# Patient Record
Sex: MALE | Race: WHITE | NOT HISPANIC OR LATINO | URBAN - METROPOLITAN AREA
[De-identification: names, ages, dates, MRNs, and addresses within clinical notes are randomized per-mention and may not be internally consistent; named-entity substitution may affect disease eponyms.]

---
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
## 2020-11-09 ENCOUNTER — OFFICE VISIT (OUTPATIENT)
Dept: URBAN - METROPOLITAN AREA CLINIC 71 | Facility: CLINIC | Age: 20
End: 2020-11-09
Payer: COMMERCIAL

## 2020-11-09 PROCEDURE — 92014 COMPRE OPH EXAM EST PT 1/>: CPT | Performed by: OPTOMETRIST

## 2020-11-09 ASSESSMENT — INTRAOCULAR PRESSURE
OS: 16
OD: 14

## 2020-11-09 NOTE — IMPRESSION/PLAN
Impression: Myopia, bilateral: H52.13 Bilateral. Plan: Discussed the diagnosis with pt in detail, new glasses rx given today. will r/c in 1 yr annual vision.

## 2021-12-02 ENCOUNTER — OFFICE VISIT (OUTPATIENT)
Dept: URBAN - METROPOLITAN AREA CLINIC 71 | Facility: CLINIC | Age: 21
End: 2021-12-02
Payer: COMMERCIAL

## 2021-12-02 DIAGNOSIS — H04.123 DRY EYE SYNDROME OF BILATERAL LACRIMAL GLANDS: Primary | ICD-10-CM

## 2021-12-02 PROCEDURE — 92014 COMPRE OPH EXAM EST PT 1/>: CPT | Performed by: OPTOMETRIST

## 2021-12-02 PROCEDURE — 92310 CONTACT LENS FITTING OU: CPT | Performed by: OPTOMETRIST

## 2021-12-02 ASSESSMENT — INTRAOCULAR PRESSURE
OS: 17
OD: 20

## 2021-12-02 ASSESSMENT — VISUAL ACUITY
OD: 20/20
OS: 20/20

## 2021-12-02 ASSESSMENT — KERATOMETRY
OD: 44.50
OS: 45.13

## 2021-12-02 NOTE — IMPRESSION/PLAN
Impression: Myopia, bilateral: H52.13. Plan: Myopia or nearsightedness develops during school age and is a result of the eyeball being too long, or the cornea having too much curvature. Patients usually complain of not being able to see or read distant objects, such as chalkboard writing, TV screen or movies. Myopia often progresses until patients reach their late twenties or early thirties. Myopia can be managed with corrective eye wear, such as eyeglasses or contacts to correct vision. New glasses and CTL RX given today. Shifting the power in the CTL's from -1.25 OU to a -1.75 OD and a -2.00 OS. Continue to follow annually for vision exams.

## 2021-12-20 NOTE — IMPRESSION/PLAN
Impression: Dry eye syndrome of bilateral lacrimal glands: H04.123. Plan: Mild dryness observed OU. There is no evidence of permanent changes to the cornea. Explained condition will need artificial tears for maintenance. Use systane balance or an artificial tear that ok to use with contacts.